# Patient Record
(demographics unavailable — no encounter records)

---

## 2025-06-26 NOTE — HISTORY OF PRESENT ILLNESS
lacerations [de-identified] : 62 year old female new patient here to establish care. Following with Annalisa Hobbs for diabetes but practices in [place] and needs new provider in [place].  She has been taking Ozempic 2mg weekly but out for the past 2 weeks. Reports her last A1c was ~7.9%.  States she was also prescribed statin but not taking.   Chart review notes history of beta thalassemia minor, paraproteinemia, iron deficiency, non insulin dependent type 2 diabetes, hyperlipidemia, peripheral neuropathy, dense breasts, class 1 obesity, Vitamin D deficiency, uterine fibroids s/p supracervical hysterectomy 2001.

## 2025-06-26 NOTE — HEALTH RISK ASSESSMENT
[No] : No [0] : 2) Feeling down, depressed, or hopeless: Not at all (0) [Never] : Never [Good] : ~his/her~  mood as  good [No falls in past year] : Patient reported no falls in the past year [PHQ-2 Negative - No further assessment needed] : PHQ-2 Negative - No further assessment needed [Fully functional (bathing, dressing, toileting, transferring, walking, feeding)] : Fully functional (bathing, dressing, toileting, transferring, walking, feeding) [Fully functional (using the telephone, shopping, preparing meals, housekeeping, doing laundry, using] : Fully functional and needs no help or supervision to perform IADLs (using the telephone, shopping, preparing meals, housekeeping, doing laundry, using transportation, managing medications and managing finances) [None] : None [With Family] : lives with family [Unemployed] : unemployed [] :  [Feels Safe at Home] : Feels safe at home [de-identified] : walk 2-3 a week. [de-identified] : no [ANR1Sikwd] : 0 [Change in mental status noted] : No change in mental status noted [High Risk Behavior] : no high risk behavior [Reports changes in hearing] : Reports no changes in hearing [Reports changes in vision] : Reports no changes in vision [MammogramDate] : 2022 [PapSmearDate] : 2021 [BoneDensityDate] : 2021 [ColonoscopyDate] : 2021 [de-identified] : daughter [FreeTextEntry2] : will start new job as

## 2025-06-26 NOTE — PHYSICAL EXAM
[Alert and Oriented x3] : oriented to person, place, and time [Normal] : soft, non-tender, non-distended, no masses palpated, no HSM and normal bowel sounds [Grossly Normal Strength/Tone] : grossly normal strength/tone [No Focal Deficits] : no focal deficits [Normal Gait] : normal gait [de-identified] : glasses

## 2025-06-26 NOTE — ASSESSMENT
[Vaccines Reviewed] : Immunizations reviewed today. Please see immunization details in the vaccine log within the immunization flowsheet.  [FreeTextEntry1] : 62 year old female here for annual exam. Refill Ozempic 2mg weekly.  Will obtain records from Endo Dr. Balwinder Hobbs. Labs drawn in office. Ordered mammogram, chronic history of inverted nipples but no acute changes.  GI and gyn referrals for cancer screenings.  PCV21 today. Declines shingles vaccine at this time.  Reviewed healthy lifestyle habits.

## 2025-06-26 NOTE — ADDENDUM
[FreeTextEntry1] : Patient presented for Capvaxive vaccination as ordered by Dr. Rabago. Prior to administration, reviewed Capvaxive VIS with patient who verbalized understanding and consent.  Patient tolerated vaccination well in right deltoid. No immediate adverse reaction noted. Patient provided Capvaxive VIS for home review as per protocol.  -[employer] NDC#6816-7256-08 Lot#[id number] Exp. 09/04/2026  Joan Hopson LPN